# Patient Record
Sex: FEMALE | URBAN - METROPOLITAN AREA
[De-identification: names, ages, dates, MRNs, and addresses within clinical notes are randomized per-mention and may not be internally consistent; named-entity substitution may affect disease eponyms.]

---

## 2022-10-25 ENCOUNTER — APPOINTMENT (RX ONLY)
Dept: URBAN - METROPOLITAN AREA CLINIC 11 | Facility: CLINIC | Age: 37
Setting detail: DERMATOLOGY
End: 2022-10-25

## 2022-10-25 DIAGNOSIS — L90.5 SCAR CONDITIONS AND FIBROSIS OF SKIN: ICD-10-CM

## 2022-10-25 PROCEDURE — 99003: CPT

## 2022-10-25 PROCEDURE — ? COUNSELING - SCAR

## 2022-10-25 NOTE — PROCEDURE: COUNSELING - SCAR
Detail Level: Simple
Count Minor/Major Decisions Toward Mdm (Not Cosmetic)?: No
Other Plan: Referral to Morenita Kim at [facility] for fractional CO2 laser, as directed by plastic surgeon in [place].